# Patient Record
Sex: MALE | Race: BLACK OR AFRICAN AMERICAN | NOT HISPANIC OR LATINO | Employment: UNEMPLOYED | URBAN - METROPOLITAN AREA
[De-identification: names, ages, dates, MRNs, and addresses within clinical notes are randomized per-mention and may not be internally consistent; named-entity substitution may affect disease eponyms.]

---

## 2017-01-11 ENCOUNTER — DOCUMENTATION ONLY (OUTPATIENT)
Dept: LAB | Facility: CLINIC | Age: 37
End: 2017-01-11

## 2017-01-11 DIAGNOSIS — Z13.1 SCREENING FOR DIABETES MELLITUS: ICD-10-CM

## 2017-01-11 DIAGNOSIS — Z13.220 SCREENING FOR CHOLESTEROL LEVEL: Primary | ICD-10-CM

## 2017-01-11 NOTE — LETTER
[address] [address]  [phone]        January 16, 2018    Aristeo Kessler  [facility] [address]              Dear Aristeo Kessler    This is to remind you that your fasting labs is due.    You may call our office at [phone] to schedule an appointment.    Please disregard this notice if you have already had your labs drawn or made an appointment.        Sincerely,        Nina Salinas MD

## 2018-02-21 ENCOUNTER — TELEPHONE (OUTPATIENT)
Dept: LAB | Facility: CLINIC | Age: 38
End: 2018-02-21

## 2022-09-09 ENCOUNTER — HOSPITAL ENCOUNTER (EMERGENCY)
Facility: CLINIC | Age: 42
Discharge: HOME | End: 2022-09-09
Payer: COMMERCIAL

## 2022-09-09 VITALS
HEIGHT: 69 IN | RESPIRATION RATE: 16 BRPM | SYSTOLIC BLOOD PRESSURE: 166 MMHG | HEART RATE: 112 BPM | BODY MASS INDEX: 25.18 KG/M2 | OXYGEN SATURATION: 99 % | TEMPERATURE: 97.6 F | WEIGHT: 170 LBS | DIASTOLIC BLOOD PRESSURE: 110 MMHG

## 2023-01-07 ENCOUNTER — HOSPITAL ENCOUNTER (EMERGENCY)
Facility: CLINIC | Age: 43
Discharge: HOME | End: 2023-01-07
Attending: EMERGENCY MEDICINE | Admitting: EMERGENCY MEDICINE
Payer: COMMERCIAL

## 2023-01-07 VITALS
RESPIRATION RATE: 18 BRPM | DIASTOLIC BLOOD PRESSURE: 111 MMHG | HEART RATE: 75 BPM | SYSTOLIC BLOOD PRESSURE: 177 MMHG | TEMPERATURE: 98.1 F | WEIGHT: 180 LBS | BODY MASS INDEX: 26.58 KG/M2 | OXYGEN SATURATION: 100 %

## 2023-01-07 DIAGNOSIS — F15.10 METHAMPHETAMINE USE (H): ICD-10-CM

## 2023-01-07 DIAGNOSIS — F22 PARANOID DELUSION (H): ICD-10-CM

## 2023-01-07 PROCEDURE — 99285 EMERGENCY DEPT VISIT HI MDM: CPT | Mod: 25

## 2023-01-07 PROCEDURE — 90791 PSYCH DIAGNOSTIC EVALUATION: CPT

## 2023-01-07 PROCEDURE — 250N000013 HC RX MED GY IP 250 OP 250 PS 637: Performed by: EMERGENCY MEDICINE

## 2023-01-07 RX ORDER — HYDROXYZINE HYDROCHLORIDE 25 MG/1
25 TABLET, FILM COATED ORAL
Status: DISCONTINUED | OUTPATIENT
Start: 2023-01-07 | End: 2023-01-07 | Stop reason: HOSPADM

## 2023-01-07 RX ORDER — ACETAMINOPHEN 325 MG/1
650 TABLET ORAL EVERY 4 HOURS PRN
Status: DISCONTINUED | OUTPATIENT
Start: 2023-01-07 | End: 2023-01-07 | Stop reason: HOSPADM

## 2023-01-07 RX ORDER — LORAZEPAM 1 MG/1
1 TABLET ORAL EVERY 8 HOURS PRN
Status: DISCONTINUED | OUTPATIENT
Start: 2023-01-07 | End: 2023-01-07 | Stop reason: HOSPADM

## 2023-01-07 RX ORDER — OLANZAPINE 10 MG/2ML
10 INJECTION, POWDER, FOR SOLUTION INTRAMUSCULAR 2 TIMES DAILY PRN
Status: DISCONTINUED | OUTPATIENT
Start: 2023-01-07 | End: 2023-01-07 | Stop reason: HOSPADM

## 2023-01-07 RX ADMIN — NICOTINE POLACRILEX 2 MG: 2 GUM, CHEWING ORAL at 14:48

## 2023-01-07 ASSESSMENT — ACTIVITIES OF DAILY LIVING (ADL)
ADLS_ACUITY_SCORE: 35

## 2023-01-07 ASSESSMENT — ENCOUNTER SYMPTOMS
ABDOMINAL DISTENTION: 0
FEVER: 0
CHILLS: 0
VOMITING: 0
SLEEP DISTURBANCE: 1
HALLUCINATIONS: 0
NERVOUS/ANXIOUS: 1

## 2023-01-07 ASSESSMENT — COLUMBIA-SUICIDE SEVERITY RATING SCALE - C-SSRS
1. IN THE PAST MONTH, HAVE YOU WISHED YOU WERE DEAD OR WISHED YOU COULD GO TO SLEEP AND NOT WAKE UP?: NO
TOTAL  NUMBER OF INTERRUPTED ATTEMPTS LIFETIME: NO
2. HAVE YOU ACTUALLY HAD ANY THOUGHTS OF KILLING YOURSELF?: NO
REASONS FOR IDEATION LIFETIME: EQUALLY TO GET ATTENTION, REVENGE, OR A REACTION FROM OTHERS AND TO END/STOP THE PAIN
6. HAVE YOU EVER DONE ANYTHING, STARTED TO DO ANYTHING, OR PREPARED TO DO ANYTHING TO END YOUR LIFE?: NO
1. IN YOUR LIFETIME, HAVE YOU WISHED YOU WERE DEAD OR WISHED YOU COULD GO TO SLEEP AND NOT WAKE UP?: FLEETING THOUGHT
TOTAL  NUMBER OF ABORTED OR SELF INTERRUPTED ATTEMPTS LIFETIME: NO
1. HAVE YOU WISHED YOU WERE DEAD OR WISHED YOU COULD GO TO SLEEP AND NOT WAKE UP?: YES
ATTEMPT LIFETIME: NO

## 2023-01-07 NOTE — DISCHARGE INSTRUCTIONS
Aftercare Plan  If I am feeling unsafe or I am in a crisis, I will:   Contact my established care providers   Call the National Suicide Prevention Lifeline: 988  Go to the nearest emergency room   Call 911     Warning signs that I or other people might notice when a crisis is developing for me: drug use, isolative, angry, paranoid    Things I am able to do on my own to cope or help me feel better: watch movies, walking, work on car      Things that I am able to do with others to cope or help me better: watch movies or tv, walking, spend time together with friends and family     Things I can use or do for distraction: working on car, watch tv, reading, going for a walk    Changes I can make to support my mental health and wellness: See appointments below (and keep them), get sleep, work on self, spend time with positive people, stop using drugs, seek drug treatment      People in my life that I can ask for help: Girlfriend, older son (sometimes)      Your [facility] has a mental health crisis team you can call 24/7: [facility] Mobile Crisis  [phone]        Additional resources and information:   APPOINTMENTS SCHEDULED DURING THIS ED VISIT:   Date: Wednesday, 1/11/2023  Time: 1:00 pm - 2:00 pm  Provider: Lorraine Kirkpatrick MD, MD  Location: [facility], [address] [place] [address]  Phone: [phone]  Type: Substance Use Disorder Assessment  THIS IS ONLY A RESERVATION. PATIENT MUST CALL [phone] TO PRE-REGISTER AND CONFIRM APPOINTMENT. Please arrive 15 min early with ID and insurance card. Insurance accepted: Henrry PHILLIP, commercial insurance. If pt has no insurance, we have a MNSure Navigator available to assist in applying for state insurance. We have 3 locations - intake appts available in [place] or [place], depending on day/time of week. Please call [phone] to verify location and pre-register to confirm appt.    Date: Wednesday, 1/11/2023  Time: 3:00 pm - 3:55 pm  Provider:  "Diane Stroud  Location: [facility], [address] [place] [address], [address], [place] [address]  Phone: [phone]  Type: Therapy - Initial (In-Person)  Patient Instructions  INTERN/Advanced-level . Please watch your EMAIL inbox/junk folder for confirmation, including a link to our CLIENT PAPERWORK. Paperwork URL: [email or web address] Paperwork must be completed prior to the appointment. Appointments will be rescheduled if paperwork is not received in advance. Covid-19 vaccine required for In-Person appt. If you have questions or need to cancel or reschedule I can be reached at [email or web address].    Crisis Lines  Crisis Text Line  Text 084008  You will be connected with a trained live crisis counselor to provide support.    Por john, texto  JASKARAN a 293970 o texto a 442-AYUDAME en WhatsApp    [facility] (LGBTQ Youth Crisis Line)  6.071.761.5914  text START to 224-042      Community Piktochart  Fast Tracker  Linking people to mental health and substance use disorder resources  [employer] Tapck[employer]ogy Co.n.org     [place] Mental Health Warm Line  Peer to peer support  Monday thru Saturday, 12 pm to 10 pm  416.771.2228 or 0.857.929.6502  Text \"Support\" to 06971    National [place] on Mental Illness (EFREN)  084.001.0581 or 1.888.EFREN.HELPS    Substance Use Disorder Direct Access Resources:    It is recommended that you abstain from all mood altering chemicals. Please contact the sober support hotline (153-683-6866) as needed; phones are answered 24 hours a day, 7 days a week.    To access substance use treatment you must have a comprehensive assessment completed to begin any treatment program.     If uninsured, please contact your county of residence for eligibility screen to substance use disorder evaluation and treatment:    [place] - [phone]   [place]  [phone]   [facility] [phone]   [place] - [phone]   [facility] [phone] "   [place] - [phone]   [place] - [phone]   [place] - [phone]     If you have private insurance, call the customer service number on the back of your insurance card to find an in-network substance abuse use disorder assessment. The ideal provider will be a treatment facility, licensed in the state [facility].     Community CARMELA Evaluations: Clients may call their county for a full list of providers - Availability and services listed belo are subject to change, please call the provider to confirm    [facility]  [phone]  [address] [place] [address], [address]  *Please call the above number to schedule a comprehensive assessment for determination of level of care needs. In person and virtual appointments available Mon-Fri.    [facility], [address] [place] [address], First Floor, [address], [place] [address] (next to the outpatient lab)    Phone: [phone]   Provides bridging services to people with Opiate Use Disorders (OUD) seeking care. This is a front door to Medication Assisted Treatments (MAT), ages 16+  Walk In hours: Monday-Friday 9:00am-3:00pm    [facility]  [phone]  Walk in Assessments: Mon-Friday 7a-1:45p  [address], [place], [address]    [facility]  Central Access [phone]  [address] [place] [address], [address]  *by appointment only    [facility]  [phone] (phone consultation available )  Locations in: [place], [facility], and [place], [place]  Swedish virtual IOP [phone] or visit [email or web address]   Also offers LGBTQ programming     [facility]  [phone] [address]  [place], [place], [address]  *Currently only offered via telehealth - call to set up an appointment    [facility]  [address] [place], [place], [address]  [facility]  For more information to to make a  referral call:  [phone]  Walk-in on Fridays  9-11 a.m.    [facility]  [phone]  [address] [place], [place], [address]  *available by appointments only    Nick gan  [phone] [address] [place], [place], [address]  *available by appointment only    [facility]  [phone]  [address] [place], [place], [address]  *walk in assessments available M-F starting at 7 am.    [facility]  [phone]  Locations: [facility], [facility], and [place]  *Walk in assessments availble M-F starting at 8 am -virtual only    [facility]  [phone]  [address] [place] [address]    [facility]  Virtual + Locations: [facility], [place], [place], [facility], [facility], [place], [place]   [phone]  *available by appointment only    [facility]  [phone]  [address]  [place], [place], [address]    [facility]  [phone]  [address] [place], [place], [address]  *available by appointment    [facility]  [phone]  [address] [place], [place], [address]  *walk ins available M-TH from 9-3    [facility] program: [phone]  [address] [place], [place], [address]    [place]  [phone]  Same day substance use disorder assessments are available Monday - Friday, via walk-in or by appointment at the [place] location.  [address], [place] [address]     [facility] - adolescent and adult SUDs services  [phone]  Offer services Monday through Friday, as well as evening hours Monday through Thursday. Normally, a first appointment will be scheduled within one week  [email or web address]-treatment  Locations all over [place]    If you are intoxicated, you may be required to detox at a detox facility before starting treatment. The following  are detox facilities that you can self present to. All detox facilities are able to help you complete an assessment prior to discharge if you choose:    [facility]: Arrive at a [facility] for immediate medical evaluation    [facility]: [address] [place], [place], [address].         [phone]    [facility]: [address] [place], [place], [address]  [phone]     [facility] ([facility]): [address] [place], [place], [address] [phone]     [facility]: [address] [place], [place], [address], [phone]         Ways to help cope with sobriety:    -- Take prescribed medicines as scheduled  -- Keep follow-up appointments  -- Talk to others about your concerns  -- Get regular exercise  -- Practice deep breathing skills  -- Eat a healthy diet  -- Use community resources, including hotline numbers, [facility] crisis and support meetings  -- Stay sober and avoid places/people/things associated with substance use  --Maintain a daily schedule/routine  --Get at least 7-8 hours of sleep per night  --Create a list 10--20 healthy activities that you can do that are enjoyable and do not involve substance use  --Create daily goals (approx. 1-4 goals) per day and work to achieve them throughout the day.       Free Resources:    [place] Recovery [facility] ([facility])  [facility] connects people seeking recovery to resources that help foster and sustain long-term recovery. Whether you are seeking resources for treatment, transportation, housing, job training, education, health care or other pathways to recovery, [facility] is a great place to start.  Phone: 350.797.7092. www.[place].vBrand (Great listing of all types of recovery and non-recovery related resources)    Alcoholics Anonymous  Phone: 4-006-ALCOHOL  Website: HTTP://WWW.AA.ORG/  [facility] [place] (306-638-2182 or http://a[place].org)  [facility] [place] (875-076-6710 or www.aastpaul.org)     Narcotics  Anonymous  Phone: 546.571.6929  Website: www.ADMI [employer].Advanced Photonix.    [employer] [address], [place], [place],  Phone: [phone]  Drop-in Hours: Monday-Friday 9-11:30 am. By appointment at other times.  Provides: [facility] is a drop-in center on the east side of [facility] that provides a safe space for individuals who are homeless and have a history of chemical use. Sobriety is not a requirement but drugs and alcohol are not allowed on the property.  Services: Non-clients can access drop-in services such as Recovery and Harm Reduction Groups, referrals to case management, community activities, shower facilities, and a pool table. Individuals who are homeless and have chemical health needs may be eligible for enrollment into [facility]'s case management program. Clients and  work together to access benefits, treatment, health care, shelter, and external housing resources.      Mental Health Apps  threadsy  https://Rivet Games.Quantapore/    VirtualHopeBox  https://TEEspy/apps/virtual-hope-box/      Additional Information  Today you were seen by a licensed mental health professional through Triage and Transition services, [facility] ([facility])  for a crisis assessment in the Emergency Department at [facility].  It is recommended that you follow up with your established providers (psychiatrist, mental health therapist, and/or primary care doctor - as relevant) as soon as possible. Coordinators from [facility] will be calling you in the next 24-48 hours to ensure that you have the resources you need.  You can also contact [facility] coordinators directly at [phone]. You may have been scheduled for or offered an appointment with a mental health provider. [facility] maintains an extensive network of licensed behavioral health providers to connect patients with the services they need.  We do not charge providers a fee to participate in our referral network.  We match  patients with providers based on a patient's specific needs, insurance coverage, and location.  Our first effort will be to refer you to a provider within your care system, and will utilize providers outside your care system as needed.

## 2023-01-07 NOTE — CONSULTS
"Diagnostic Evaluation Consultation  Crisis Assessment    Patient was assessed: Kenn[place]  Patient location: [place] ED    Was a release of information signed: Yes. Providers included on the release: [facility]      Referral Data and Chief Complaint  Aristeo is a 42 year old, who uses he/him pronouns, and presents to the ED other: not identified by ED . Patient is referred to the ED by self. Patient is presenting to the ED for the following concerns: was concerned about his son, who is 8 years old.      Informed Consent and Assessment Methods     Patient is his own guardian. Writer met with patient and explained the crisis assessment process, including applicable information disclosures and limits to confidentiality, assessed understanding of the process, and obtained consent to proceed with the assessment. Patient was observed to be able to participate in the assessment as evidenced by cooperative. Assessment methods included conducting a formal interview with patient, review of medical records, collaboration with medical staff, and obtaining relevant collateral information from family and community providers when available..     Over the course of this crisis assessment offered validation, engaged patient in problem solving and disposition planning, worked with patient on safety and aftercare planning, assisted in processing patient's thoughts and feeling relating to drug use and relationships with friends and family  and provided psychoeducation. Patient's response to interventions was cooperative, hyperverbal, but redirectable.      Summary of Patient Situation  Aristeo presented with 8 year old son to the ED due to worries about his son acting strange and ultimately concerned his son is lying to him. Aristeo reported using marijuana daily and methamphetamines \"every once in awhile\" with last use being last night just before he was about to  his son from his girlfriend's house. Aristeo was cooperative during assessment, but was " "hyperverbal, thoughts tangential/loose associations. Aristeo seems to perseverate about people \"going against him\" and conspiring behind his back.        Brief Psychosocial History  Pt lives with his girlfriend in [facility] [facility]. Pt reported he moved from [place] about 6 years ago to be closer to Dorothy, his girlfriend. Pt reported he enjoys working on his car, but also appeared to be paranoid about people messing with his car. Pt is unemployed.     Significant Clinical History     Pt denied any mental health history. Pt reported daily marijuana use and semi-frequent methamphetamine use (last use being 1/6/23 in the evening/night time). Pt denied any mental health hospitalizations/treatment programs. When Writer asked Pt about trauma, he replied he has had a difficult life.      Collateral Information  Writer LIN for Aristeo De La Cruz's girlfriend who he lives with @ [phone]     Risk Assessment  [place] Suicide Severity Rating Scale Full Clinical Version:  Suicidal Ideation  1. Wish to be Dead (Lifetime): Yes  Wish to be Dead Description (Lifetime): fleeting thought  1. Wish to be Dead (Past 1 Month): No  2. Non-Specific Active Suicidal Thoughts (Lifetime): No  Intensity of Ideation  Most Severe Ideation Rating (Lifetime): 1  Frequency (Lifetime): Less than once a week  Frequency (Past 1 Month): Less than once a week  Duration (Lifetime): Fleeting, few seconds or minutes  Duration (Past 1 Month):  (none)  Controllability (Lifetime): Easily able to control thoughts  Deterrents (Lifetime): Deterrents definitely stopped you from attempting suicide  Reasons for Ideation (Lifetime): Equally to get attention, revenge, or a reaction from others and to end/stop the pain  Suicidal Behavior  Actual Attempt (Lifetime): No  Has subject engaged in non-suicidal self-injurious behavior? (Lifetime): No  Interrupted Attempts (Lifetime): No  Aborted or Self-Interrupted Attempt (Lifetime): No  Preparatory Acts or " Behavior (Lifetime): No  C-SSRS Risk (Lifetime/Recent)  Calculated C-SSRS Risk Score (Lifetime/Recent): No Risk Indicated    Validity of evaluation is impacted by presenting factors during interview Pt was paranoid, hyperverbal, and focused on discharge.   Comments regarding subjective versus objective responses to Sterling tool: n/a  Environmental or Psychosocial Events: challenging interpersonal relationships, impulsivity/recklessness and ongoing abuse of substances  Chronic Risk Factors: other: drug use   Warning Signs: Pt is not suicidal   Protective Factors: responsibilities and duties to others, including pets and children  Interpretation of Risk Scoring, Risk Mitigation Interventions and Safety Plan:  Pt does not appear to be in danger to self, he may be paranoid, experiencing symptoms of psychosis, and a drug user, however, this does not mean he is in danger to himself/other adults at this time.     Does the patient have thoughts of harming others? No     Is the patient engaging in sexually inappropriate behavior?  no        Current Substance Abuse     Is there recent substance abuse? Substance type(s): marijuana Frequency: daily Quantity: unknown Method: smokes Duration: years Last use: yesterday   Substance: methamphetamines Frequency: weekly, or every other day Quantity: unknown Method: unknown Duration: a few years Last Use: yesterday night      Was a urine drug screen or blood alcohol level obtained: see Epic        Mental Status Exam     Affect: Dramatic and Labile   Appearance: Appropriate    Attention Span/Concentration: Attentive  Eye Contact: Intense   Fund of Knowledge: Appropriate and Delayed    Language /Speech Content: Fluent and Expressive Speech   Language /Speech Volume: Loud and Normal    Language /Speech Rate/Productions: Hyperverbal and Pressured    Recent Memory: Variable   Remote Memory: Variable   Mood: Anxious and Normal    Orientation to Person: Yes    Orientation to Place: Yes    Orientation to Time of Day: Yes    Orientation to Date: Yes    Situation (Do they understand why they are here?): Yes    Psychomotor Behavior: Hyperactive    Thought Content: Delusions and Paranoia   Thought Form: Loose Associations, Obsessive/Perseverative and Tangential      History of commitment: No           Medication    Psychotropic medications:see medications   Medication changes made in the last two weeks: No       Current Care Team    No providers, Pt was open to substance use disorder assessment and individual psychotherapy appointment.       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 Aristeo reported one time having a fleeting thought of suicide a couple of years ago when they were in [place]. Aristeo was honest about being a drug user, but denies drug use around child. Aristeo may be suffering from true mental health symptoms, but until methamphetamines are clear from his system, it is difficult to tell. Aristeo does not pose a threat to self or others at this time, and CPS is involved on the parenting side of things. It is Writer's opinion that Aristeo does not meet criteria for inpatient psychiatric hospitalization at this time, but Dr. Mcgee would like to evaluate him further.   Disposition    Recommended disposition: Individual Therapy and Substance Abuse Disorder Treatment       Reviewed case and recommendations with attending provider. Attending Name: Dr. Lady Mcgee MD        Attending concurs with disposition: No: will continue to monitor        Patient concurs with disposition: Please meet with Pt to discuss       Guardian concurs with disposition: NA      Final disposition: Other: Observation for now .       Outpatient Details (if applicable):   Aftercare plan and appointments placed in the AVS and provided to patient: Yes. Given to patient by RN when DC    Was lethal means counseling provided as a part of aftercare planning? No;       Assessment Details    Patient interview started at: 1:31pm and completed at: 2:45pm.     Total duration spent on the patient case in minutes: 2.50 hrs      CPT code(s) utilized: 64766 - Psychotherapy for Crisis - 60 (30-74*) min and 65662 - Psychotherapy for Crisis (Each additional 30 minutes) - 30 min        SOY Wilson, EDNA, SOY, Psychotherapist  DEC - Triage & Transition Services  Callback: [phone]      Aftercare Plan  If I am feeling unsafe or I am in a crisis, I will:   Contact my established care providers   Call the National Suicide Prevention Lifeline: 988  Go to the nearest emergency room   Call 911     Warning signs that I or other people might notice when a crisis is developing for me: drug use, isolative, angry, paranoid    Things I am able to do on my own to cope or help me feel better: watch movies, walking, work on car      Things that I am able to do with others to cope or help me better: watch movies or tv, walking, spend time together with friends and family     Things I can use or do for distraction: working on car, watch tv, reading, going for a walk    Changes I can make to support my mental health and wellness: See appointments below (and keep them), get sleep, work on self, spend time with positive people, stop using drugs, seek drug treatment      People in my life that I can ask for help: Girlfriend, older son (sometimes)      Your [facility] has a mental health crisis team you can call 24/7: [facility] Mobile Crisis  [phone]        Additional resources and information:   APPOINTMENTS SCHEDULED DURING THIS ED VISIT:   Date: Wednesday, 1/11/2023   Time: 1:00 pm - 2:00 pm  Provider: Lorraine Kirkpatrick MD, MD  Location: [facility], [address] [place] [address]  Phone: [phone]  Type: Substance Use Disorder Assessment  THIS IS ONLY A RESERVATION. PATIENT MUST CALL [phone] TO PRE-REGISTER AND CONFIRM APPOINTMENT. Please arrive 15 min early with ID and insurance card. Insurance accepted: [place], PMAPs, commercial insurance. If pt has no insurance, we have a [facility] Navigator available to assist in applying for state insurance. We have 3 locations - intake appts available in [place] or [place], depending on day/time of week. Please call [phone] to verify location and pre-register to confirm appt.    Date: Wednesday, 1/11/2023  Time: 3:00 pm - 3:55 pm  Provider: Diane Stroud  Location: [facility], [address] [place] [address], [address] [address]  Phone: [phone]  Type: Therapy - Initial (In-Person)  Patient Instructions  INTERN/Advanced-level . Please watch your EMAIL inbox/junk folder for confirmation, including a link to our CLIENT PAPERWORK. Paperwork URL: [email or web address] Paperwork must be completed prior to the appointment. Appointments will be rescheduled if paperwork is not received in advance. Covid-19 vaccine required for In-Person appt. If you have questions or need to cancel or reschedule I can be reached at [email or web address].    Crisis Lines  Crisis Text Line  Text 020711  You will be connected with a trained live crisis counselor to provide support.    Por espanol, texto  JASKARAN a 596767 o texto a 442-AYUDAME en WhatsApp    [employer] [facility] (LGBTQ Youth Crisis Line)  2.122.857.5819  text START to 530-853      Community Resources  Fast Tracker  Linking people to mental health and substance use disorder resources  [employer].org     [place] [facility] Warm Line  Peer to peer support  Monday thru Saturday, 12 pm to 10 pm   "597.408.2982 or 7.132.772.7336  Text \"Support\" to 96543    National [place] on Mental Illness (EFREN)  220.273.2635 or 1.888.EFREN.HELPS    Substance Use Disorder Direct Access Resources:     It is recommended that you abstain from all mood altering chemicals. Please contact the sober support hotline (420-847-8190) as needed; phones are answered 24 hours a day, 7 days a week.     To access substance use treatment you must have a comprehensive assessment completed to begin any treatment program.      If uninsured, please contact your county of residence for eligibility screen to substance use disorder evaluation and treatment:     [place] - [phone]   [place] - [phone]   [facility] [phone]   [place]  [phone]   [facility] [phone]   [place] - [phone]   [facility] [phone]   [place] - [phone]      If you have private insurance, call the customer service number on the back of your insurance card to find an in-network substance abuse use disorder assessment. The ideal provider will be a treatment facility, licensed in the [facility].      Community CARMELA Evaluations: Clients may call their county for a full list of providers - Availability and services listed belo are subject to change, please call the provider to confirm     [facility]  [phone]  [address] [place] [address], [address]  *Please call the above number to schedule a comprehensive assessment for determination of level of care needs. In person and virtual appointments available Mon-Fri.     [facility], [facility] [address] Floor, [address], [place] [address] (next to the outpatient lab)    Phone: [phone]   Provides bridging services to people with Opiate Use Disorders (OUD) seeking care. This is a front door to Medication Assisted Treatments (MAT), ages 16+  Walk In hours: Monday-Friday 9:00am-3:00pm     [facility]  [phone]  Walk in " Assessments: Mon-Friday 7a-1:45p  [address] [facility], [address]     [facility]  Central Access [phone]  [address] [place], [place], [address]  *by appointment only     [facility]  [phone] (phone consultation available )  Locations in: [place], [place], [facility], and [place], [place]  [place] virtual IOP [phone] or visit [email or web address]   Also offers LGBTQ programming     [facility]  [phone]  [address] [facility]  [place], [place], [address]  *Currently only offered via telehealth - call to set up an appointment     [facility]  [address] [place] [address], [address]  [facility] Program  For more information to to make a referral call:  [phone]  Walk-in on   9-11 a.m.     [facility]  [phone]  [address] [place], [place], [address]  *available by appointments only     [facility]  [phone]  [address] [place], [place], [address]  *available by appointment only     [facility]  [phone]  [address] [place], [place], [address]  *walk in assessments available M-F starting at 7 am.     [facility]  [phone]  Locations: [facility], [facility], and [place]  *Walk in assessments availble M-F starting at 8 am -virtual only     [facility]  [phone]  [address] [place] [address]     [facility]  Virtual + Locations: [place], [place], [place], [place], [facility], [facility], Shavonne Geronimo   [phone]  *available by appointment only     [facility]  [phone]  [address]  [place], [address]     [facility]  [phone]  [address] [place], [place], [address]  *available by appointment     [facility]  [phone]  [address] [place], [place],  [address]  *walk ins available M-TH from 9-3     [facility]  MAT program: [phone]  [address]  [place], [place], [address]     [place]  [phone]  Same day substance use disorder assessments are available Monday - Friday, via walk-in or by appointment at the [place] location.  [address], [address], [place] [address]      [facility] - adolescent and adult SUDs services  [phone]  Offer services Monday through Friday, as well as evening hours Monday through Thursday. Normally, a first appointment will be scheduled within one week  [email or web address]  Locations all over [place]     If you are intoxicated, you may be required to detox at a detox facility before starting treatment. The following are detox facilities that you can self present to. All detox facilities are able to help you complete an assessment prior to discharge if you choose:     [place] [facility]: Arrive at a [place] [facility] for immediate medical evaluation     [facility]: [address] [place], [place], [address].         [phone]     [facility]: [address] [place], [place], [address]  [phone]      [facility] ([facility]): [address] [place], [place], [address] [phone]      [facility]: [address] [place], [place], [address], [phone]           Ways to help cope with sobriety:     -- Take prescribed medicines as scheduled  -- Keep follow-up appointments  -- Talk to others about your concerns  -- Get regular exercise  -- Practice deep breathing skills  -- Eat a healthy diet  -- Use community resources, including hotline numbers, [facility] crisis and support meetings  -- Stay sober and avoid places/people/things associated with substance use  --Maintain a daily schedule/routine  --Get at least 7-8 hours of sleep per night  --Create a list 10--20 healthy activities that you can do that are  enjoyable and do not involve substance use  --Create daily goals (approx. 1-4 goals) per day and work to achieve them throughout the day.        Free Resources:     [facility] ([facility])  [facility] connects people seeking recovery to resources that help foster and sustain long-term recovery. Whether you are seeking resources for treatment, transportation, housing, job training, education, health care or other pathways to recovery, [facility] is a great place to start.  Phone: 969.792.6753. www.minnesotaFingo.Thrasos (Great listing of all types of recovery and non-recovery related resources)     Alcoholics Anonymous  Phone: 4-039-ALCOHOL  Website: HTTP://WWW.AA.ORG/  AA [place] (189-871-3252 or http://aaBee Resilient.org)  AA [place] (390-614-6871 or www.aastpaul.org)     Narcotics Anonymous  Phone: 873.247.8490  Website: www.BioMedomics.Coderwall.     [facility] [address], [place], [place],  Phone: [phone]  Drop-in Hours: Monday-Friday 9-11:30 am. By appointment at other times.  Provides: [facility] is a drop-in center on the east side [facility] that provides a safe space for individuals who are homeless and have a history of chemical use. Sobriety is not a requirement but drugs and alcohol are not allowed on the property.  Services: Non-clients can access drop-in services such as Recovery and Harm Reduction Groups, referrals to case management, community activities, shower facilities, and a pool table. Individuals who are homeless and have chemical health needs may be eligible for enrollment into [facility]'s case management program. Clients and  work together to access benefits, treatment, health care, shelter, and external housing resources.      Mental Health Apps  My3  https://myBbready.compp.org/    VirtualHopeBox  https://Re-Sec Technologies.org/apps/virtual-hope-box/      Additional Information  Today you were seen by a licensed mental health professional through  Triage and Transition services, Behavioral Healthcare Providers ([facility])  for a crisis assessment in the Emergency Department at [facility].  It is recommended that you follow up with your established providers (psychiatrist, mental health therapist, and/or primary care doctor - as relevant) as soon as possible. Coordinators from [facility] will be calling you in the next 24-48 hours to ensure that you have the resources you need.  You can also contact [facility] coordinators directly at [phone]. You may have been scheduled for or offered an appointment with a mental health provider. [facility] maintains an extensive network of licensed behavioral health providers to connect patients with the services they need.  We do not charge providers a fee to participate in our referral network.  We match patients with providers based on a patient's specific needs, insurance coverage, and location.  Our first effort will be to refer you to a provider within your care system, and will utilize providers outside your care system as needed.

## 2023-01-07 NOTE — ED NOTES
".  Aristeo Kessler  January 7, 2023  Plan of Care Hand-off Note     Patient Care Path: Observation    Plan for Care:   Aristeo presented with 8 year old son to the ED due to worries about his son acting strange and ultimately concerned his son is lying to him. Aristeo reported using marijuana daily and methamphetamines \"every once in awhile\" with last use being last night just before he was about to  his son from his girlfriend's house. Aristeo was cooperative during assessment, but was hyperverbal, thoughts tangential/loose associations. Aristeo seems to perseverate about people \"going against him\" and conspiring behind his back.    Aristeo denied SI/HI/SIB. Aristeo reported one time having a fleeting thought of suicide a couple of years ago when they were in [place], [place]. Aristeo was honest about being a drug user, but denies drug use around child. Aristeo may be suffering from true mental health symptoms, but until methamphetamines are clear from his system, it is difficult to tell. Aristeo does not pose a threat to self or others at this time, and CPS is involved on the parenting side of things. It is Writer's opinion that Aristeo does not meet criteria for inpatient psychiatric hospitalization at this time, but Dr. Mcgee would like to evaluate him further.   Aristeo agreed to safety planning and was agreeable to Writer setting up the following appointments for him:     Date: Wednesday, 1/11/2023  Time: 1:00 pm - 2:00 pm  Provider: Lorraine Kirkpatrick MD, MD  Location: [facility], [address] [place] [address]  Phone: [phone]  Type: Substance Use Disorder Assessment  THIS IS ONLY A RESERVATION. PATIENT MUST CALL [phone] TO PRE-REGISTER AND CONFIRM APPOINTMENT. Please arrive 15 min early with ID and insurance card. Insurance accepted: [place], PMAPs, commercial insurance. If pt has no insurance, we have a MNSure Navigator available to assist in applying for state insurance. We have 3 locations - intake appts " available in [place] or [place], depending on day/time of week. Please call [phone] to verify location and pre-register to confirm appt.     Date: Wednesday, 1/11/2023  Time: 3:00 pm - 3:55 pm  Provider: Diane Stroud  Location: [facility], [address] [place] [address], [address], [place] [address]  Phone: [phone]  Type: Therapy - Initial (In-Person)  Patient Instructions  INTERN/Advanced-level . Please watch your EMAIL inbox/junk folder for confirmation, including a link to our CLIENT PAPERWORK. Paperwork URL: [email or web address] Paperwork must be completed prior to the appointment. Appointments will be rescheduled if paperwork is not received in advance. Covid-19 vaccine required for In-Person appt. If you have questions or need to cancel or reschedule I can be reached at [email or web address].    Critical Safety Issues: drug use, CPS involvement with son, paranoid     Overview:  This patient is a child/adolescent: No    This patient has additional special visitor precautions: No    Legal Status: Voluntary    Contacts:   Dorothy, girlfriend: Contact [phone] (voicemail was left)     Psychiatry Consult:  Please consult with Dr. Arely MD on psychiatry consult     Updated Attending Provider and coordination staff  regarding plan of care.    JOSEFINA WilsonSW
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
## 2023-01-07 NOTE — ED NOTES
Pt. Talking on the phone for 35 min. Went in and pt. Is speaking calmly but is stressed. Speech is pressured and as our conversation went on, pt getting impatient.

## 2023-01-08 NOTE — ED NOTES
Dr. Pepper and myself informed the pt that his child is safe with cps and he was given the number for cps. Pt states he wants to go home. Pt is tearful and upset, but is stable to be discharged safely.

## 2024-02-10 ENCOUNTER — OFFICE VISIT (OUTPATIENT)
Dept: URGENT CARE | Facility: URGENT CARE | Age: 44
End: 2024-02-10
Payer: COMMERCIAL

## 2024-02-10 VITALS
OXYGEN SATURATION: 96 % | HEART RATE: 77 BPM | DIASTOLIC BLOOD PRESSURE: 88 MMHG | BODY MASS INDEX: 26.58 KG/M2 | WEIGHT: 180 LBS | SYSTOLIC BLOOD PRESSURE: 158 MMHG | TEMPERATURE: 97.7 F

## 2024-02-10 DIAGNOSIS — I10 UNCONTROLLED HYPERTENSION: Primary | ICD-10-CM

## 2024-02-10 DIAGNOSIS — Z83.3 FAMILY HISTORY OF DIABETES MELLITUS: ICD-10-CM

## 2024-02-10 DIAGNOSIS — T46.4X5A ADVERSE REACTION TO ACE INHIBITOR DRUG, INITIAL ENCOUNTER: ICD-10-CM

## 2024-02-10 LAB
ANION GAP SERPL CALCULATED.3IONS-SCNC: 5 MMOL/L (ref 3–14)
BASOPHILS # BLD AUTO: 0 10E3/UL (ref 0–0.2)
BASOPHILS NFR BLD AUTO: 0 %
BUN SERPL-MCNC: 11 MG/DL (ref 7–30)
CALCIUM SERPL-MCNC: 10.2 MG/DL (ref 8.5–10.1)
CHLORIDE BLD-SCNC: 109 MMOL/L (ref 94–109)
CO2 SERPL-SCNC: 28 MMOL/L (ref 20–32)
CREAT SERPL-MCNC: 1.2 MG/DL (ref 0.66–1.25)
EGFRCR SERPLBLD CKD-EPI 2021: 76 ML/MIN/1.73M2
EOSINOPHIL # BLD AUTO: 0 10E3/UL (ref 0–0.7)
EOSINOPHIL NFR BLD AUTO: 1 %
ERYTHROCYTE [DISTWIDTH] IN BLOOD BY AUTOMATED COUNT: 13.5 % (ref 10–15)
GLUCOSE BLD-MCNC: 109 MG/DL (ref 70–99)
HBA1C MFR BLD: 5.2 % (ref 0–5.6)
HCT VFR BLD AUTO: 44.2 % (ref 40–53)
HGB BLD-MCNC: 15.3 G/DL (ref 13.3–17.7)
IMM GRANULOCYTES # BLD: 0 10E3/UL
IMM GRANULOCYTES NFR BLD: 0 %
LYMPHOCYTES # BLD AUTO: 2 10E3/UL (ref 0.8–5.3)
LYMPHOCYTES NFR BLD AUTO: 37 %
MCH RBC QN AUTO: 33.2 PG (ref 26.5–33)
MCHC RBC AUTO-ENTMCNC: 34.6 G/DL (ref 31.5–36.5)
MCV RBC AUTO: 96 FL (ref 78–100)
MONOCYTES # BLD AUTO: 0.6 10E3/UL (ref 0–1.3)
MONOCYTES NFR BLD AUTO: 10 %
NEUTROPHILS # BLD AUTO: 2.9 10E3/UL (ref 1.6–8.3)
NEUTROPHILS NFR BLD AUTO: 52 %
PLATELET # BLD AUTO: 274 10E3/UL (ref 150–450)
POTASSIUM BLD-SCNC: 3.9 MMOL/L (ref 3.4–5.3)
RBC # BLD AUTO: 4.61 10E6/UL (ref 4.4–5.9)
SODIUM SERPL-SCNC: 142 MMOL/L (ref 135–145)
WBC # BLD AUTO: 5.5 10E3/UL (ref 4–11)

## 2024-02-10 PROCEDURE — 93000 ELECTROCARDIOGRAM COMPLETE: CPT | Performed by: NURSE PRACTITIONER

## 2024-02-10 PROCEDURE — 85025 COMPLETE CBC W/AUTO DIFF WBC: CPT | Performed by: NURSE PRACTITIONER

## 2024-02-10 PROCEDURE — 80048 BASIC METABOLIC PNL TOTAL CA: CPT | Performed by: NURSE PRACTITIONER

## 2024-02-10 PROCEDURE — 36415 COLL VENOUS BLD VENIPUNCTURE: CPT | Performed by: NURSE PRACTITIONER

## 2024-02-10 PROCEDURE — 83036 HEMOGLOBIN GLYCOSYLATED A1C: CPT | Performed by: NURSE PRACTITIONER

## 2024-02-10 PROCEDURE — 99204 OFFICE O/P NEW MOD 45 MIN: CPT | Mod: 25 | Performed by: NURSE PRACTITIONER

## 2024-02-10 RX ORDER — AMLODIPINE BESYLATE 5 MG/1
5 TABLET ORAL DAILY
Qty: 60 TABLET | Refills: 0 | Status: SHIPPED | OUTPATIENT
Start: 2024-02-10 | End: 2024-04-10

## 2024-02-10 RX ORDER — LISINOPRIL/HYDROCHLOROTHIAZIDE 10-12.5 MG
1 TABLET ORAL DAILY
COMMUNITY
End: 2024-02-10

## 2024-02-11 NOTE — PROGRESS NOTES
Chief Complaint   Patient presents with    Urgent Care     High Blood Pressure 189/119         ICD-10-CM    1. Uncontrolled hypertension  I10 EKG 12-lead complete w/read - Clinics     CBC with platelets and differential     Basic metabolic panel  (Ca, Cl, CO2, Creat, Gluc, K, Na, BUN)     CBC with platelets and differential     amLODIPine (NORVASC) 5 MG tablet     CANCELED: Basic metabolic panel  (Ca, Cl, CO2, Creat, Gluc, K, Na, BUN)      2. Family history of diabetes mellitus  Z83.3 Hemoglobin A1c     Hemoglobin A1c      3. Adverse reaction to ACE inhibitor drug, initial encounter  T46.4X5A Basic metabolic panel  (Ca, Cl, CO2, Creat, Gluc, K, Na, BUN)     CANCELED: Basic metabolic panel  (Ca, Cl, CO2, Creat, Gluc, K, Na, BUN)        Patient is to discontinue lisinopril/hydrochlorothiazide.  He will begin taking amlodipine.  He is to check his blood pressure twice a day and keep a log of this to share with primary care.  Encouraged him to make visit with primary care before he runs out of this new medication.  We discussed low-sodium diet.    Red flag warning signs and when to go to the emergency room discussed.  Reviewed potential adverse reactions to medications.      EKG - Reviewed and interpreted by me appears normal, NSR, normal axis, normal intervals, no acute ST/T changes c/w ischemia    Results for orders placed or performed in visit on 02/10/24 (from the past 24 hour(s))   Basic metabolic panel  (Ca, Cl, CO2, Creat, Gluc, K, Na, BUN)   Result Value Ref Range    Sodium 142 135 - 145 mmol/L    Potassium 3.9 3.4 - 5.3 mmol/L    Chloride 109 94 - 109 mmol/L    Carbon Dioxide (CO2) 28 20 - 32 mmol/L    Anion Gap 5 3 - 14 mmol/L    Urea Nitrogen 11 7 - 30 mg/dL    Creatinine 1.20 0.66 - 1.25 mg/dL    GFR Estimate 76 >60 mL/min/1.73m2    Calcium 10.2 (H) 8.5 - 10.1 mg/dL    Glucose 109 (H) 70 - 99 mg/dL   Hemoglobin A1c   Result Value Ref Range    Hemoglobin A1C 5.2 0.0 - 5.6 %   CBC with platelets and  differential    Narrative    The following orders were created for panel order CBC with platelets and differential.  Procedure                               Abnormality         Status                     ---------                               -----------         ------                     CBC with platelets and d...[[id number]]  Abnormal            Final result                 Please view results for these tests on the individual orders.   CBC with platelets and differential   Result Value Ref Range    WBC Count 5.5 4.0 - 11.0 10e3/uL    RBC Count 4.61 4.40 - 5.90 10e6/uL    Hemoglobin 15.3 13.3 - 17.7 g/dL    Hematocrit 44.2 40.0 - 53.0 %    MCV 96 78 - 100 fL    MCH 33.2 (H) 26.5 - 33.0 pg    MCHC 34.6 31.5 - 36.5 g/dL    RDW 13.5 10.0 - 15.0 %    Platelet Count 274 150 - 450 10e3/uL    % Neutrophils 52 %    % Lymphocytes 37 %    % Monocytes 10 %    % Eosinophils 1 %    % Basophils 0 %    % Immature Granulocytes 0 %    Absolute Neutrophils 2.9 1.6 - 8.3 10e3/uL    Absolute Lymphocytes 2.0 0.8 - 5.3 10e3/uL    Absolute Monocytes 0.6 0.0 - 1.3 10e3/uL    Absolute Eosinophils 0.0 0.0 - 0.7 10e3/uL    Absolute Basophils 0.0 0.0 - 0.2 10e3/uL    Absolute Immature Granulocytes 0.0 <=0.4 10e3/uL       Subjective     Aristeo Kessler is an 44 year old male who presents to clinic today for uncontrolled hypertension.  He took his blood pressure at home today and found it to be around 180/100, went to [employer] and repeated it and it was the same there.  He has been on blood pressure medications for about 9 to 10 years.  For many years he was on hydrochlorothiazide then last year it seemed to stop working so lisinopril was added.  He has been doing fine on that medication until just recently when he started having symptoms he thinks may be adverse reaction to the medication.  They include premature ejaculation, loose stools, fatigue, headache, dizziness with change of position.  He believes his medication needs to be changed  again.  He is due for follow-up appointment with primary care in April but has not made that appointment yet.    ROS: 10 point ROS neg other than the symptoms noted above in the HPI.       Objective    BP (!) 158/88   Pulse 77   Temp 97.7  F (36.5  C) (Tympanic)   Wt 81.6 kg (180 lb)   SpO2 96%   BMI 26.58 kg/m    Nurses notes and VS have been reviewed.    Physical Exam       GENERAL APPEARANCE: healthy appearing, alert     EYES: PERRL, EOMI, sclera non-icteric     HENT: oral exam benign, mucus membranes intact, without ulcers or lesions     NECK: no adenopathy or asymmetry, thyroid normal to palpation     RESP: lungs clear to auscultation - no rales, rhonchi or wheezes     CV: regular rates and rhythm, no murmurs, rubs, or gallop     MS: extremities normal- no gross deformities noted; normal muscle tone.     SKIN: no suspicious lesions or rashes     NEURO: Normal strength and tone, mentation intact and speech normal     PSYCH: normal thought process; no significant mood disturbance      JOLYNN Chavira, CNP  [facility] Provider    The use of Dragon/Openet dictation services may have been used to construct the content in this note; any grammatical or spelling errors are non-intentional. Please contact the author of this note directly if you are in need of any clarification.

## 2024-02-11 NOTE — PATIENT INSTRUCTIONS
Check blood pressure twice a day and keep a log of this at home to share with your primary care provider.    Discontinue the lisinopril with hydrochlorothiazide.    Start taking amlodipine once a day.    Make follow-up appointment with primary care as soon as possible.

## 2024-02-23 ENCOUNTER — HOSPITAL ENCOUNTER (EMERGENCY)
Facility: CLINIC | Age: 44
Discharge: HOME | End: 2024-02-23
Attending: EMERGENCY MEDICINE | Admitting: EMERGENCY MEDICINE
Payer: COMMERCIAL

## 2024-02-23 VITALS
OXYGEN SATURATION: 100 % | DIASTOLIC BLOOD PRESSURE: 133 MMHG | RESPIRATION RATE: 20 BRPM | HEART RATE: 107 BPM | SYSTOLIC BLOOD PRESSURE: 191 MMHG | TEMPERATURE: 98.2 F

## 2024-02-23 DIAGNOSIS — F22 PARANOIA (H): ICD-10-CM

## 2024-02-23 PROBLEM — F29 PSYCHOTIC DISORDER WITH DELUSIONS (H): Status: ACTIVE | Noted: 2024-02-23

## 2024-02-23 PROCEDURE — 99283 EMERGENCY DEPT VISIT LOW MDM: CPT

## 2024-02-23 RX ORDER — LORAZEPAM 1 MG/1
1 TABLET ORAL EVERY 8 HOURS PRN
Status: DISCONTINUED | OUTPATIENT
Start: 2024-02-23 | End: 2024-02-23 | Stop reason: HOSPADM

## 2024-02-23 RX ORDER — AMLODIPINE BESYLATE 5 MG/1
5 TABLET ORAL DAILY
Status: DISCONTINUED | OUTPATIENT
Start: 2024-02-23 | End: 2024-02-23 | Stop reason: HOSPADM

## 2024-02-23 ASSESSMENT — ACTIVITIES OF DAILY LIVING (ADL)
ADLS_ACUITY_SCORE: 35

## 2024-02-23 NOTE — ED NOTES
I went in to give pt his norvasc - pt refused stating this is not his med - he wants his med that he knows is his.  I went to talk to pharmacy about this - but pt is now up for discharge.     Pt will be discharged.

## 2024-02-23 NOTE — ED NOTES
Bed: [facility]  Expected date:   Expected time:   Means of arrival:   Comments:  Sg 542 42M paranoid, on a hold tea 0146
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
## 2024-02-23 NOTE — ED NOTES
Pt was given his belongings and then he stated that his phone must have been messed with because he could not get into it.   Pt was trying to figure out where his car is at this time . I offered to help - calling [facility] sheriff - who directed me to the hospitals impound lots. Per that conversation they do not have a car under this pt's name in any of the lots. I was given the hospitals airport police number - I wrote that down for this patient - pt was outside in the chairs by  because he is officially discharged - on the phone ( that he was able to get into . )     Pt took the number I set down for him for the police and threw it in the garbage . Bus tokens were offerd but pt stated he had cash .

## 2024-02-23 NOTE — ED PROVIDER NOTES
Patient was signed out to me by Dr. Gonzales.  Briefly, he presented with some paranoia.  Plan was for him to be seen by DEC, which has just been completed.  The DEC  spoke with me about him as well and does not have any concerns about him being discharged.  The patient is refusing any type of follow-up mental health care, as he does not believe that he has any mental health issues.  This could potentially be substance induced as well.  At this point however I will be discharging him.     Tristen Ceja MD  02/23/24 8006
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
